# Patient Record
Sex: FEMALE | Race: WHITE
[De-identification: names, ages, dates, MRNs, and addresses within clinical notes are randomized per-mention and may not be internally consistent; named-entity substitution may affect disease eponyms.]

---

## 2018-07-05 ENCOUNTER — HOSPITAL ENCOUNTER (OUTPATIENT)
Dept: HOSPITAL 58 - RHC-LAB | Age: 43
Discharge: HOME | End: 2018-07-05
Attending: INTERNAL MEDICINE
Payer: COMMERCIAL

## 2018-07-05 DIAGNOSIS — Z79.01: ICD-10-CM

## 2018-07-05 DIAGNOSIS — Z51.81: Primary | ICD-10-CM

## 2018-07-05 DIAGNOSIS — Z86.711: ICD-10-CM

## 2018-07-05 PROCEDURE — 85610 PROTHROMBIN TIME: CPT

## 2018-07-05 PROCEDURE — 36415 COLL VENOUS BLD VENIPUNCTURE: CPT

## 2018-07-09 ENCOUNTER — HOSPITAL ENCOUNTER (OUTPATIENT)
Dept: HOSPITAL 58 - RAD | Age: 43
Discharge: HOME | End: 2018-07-09
Attending: INTERNAL MEDICINE
Payer: COMMERCIAL

## 2018-07-09 DIAGNOSIS — Z12.31: Primary | ICD-10-CM

## 2018-07-09 PROCEDURE — 77067 SCR MAMMO BI INCL CAD: CPT

## 2018-07-16 ENCOUNTER — HOSPITAL ENCOUNTER (OUTPATIENT)
Dept: HOSPITAL 58 - RHC-LAB | Age: 43
Discharge: HOME | End: 2018-07-16
Attending: INTERNAL MEDICINE

## 2018-07-16 DIAGNOSIS — Z51.81: Primary | ICD-10-CM

## 2018-07-16 DIAGNOSIS — Z79.01: ICD-10-CM

## 2018-07-16 PROCEDURE — 36415 COLL VENOUS BLD VENIPUNCTURE: CPT

## 2018-07-16 PROCEDURE — 85610 PROTHROMBIN TIME: CPT

## 2019-02-21 ENCOUNTER — HOSPITAL ENCOUNTER (OUTPATIENT)
Dept: HOSPITAL 58 - RHC-LAB | Age: 44
Discharge: HOME | End: 2019-02-21
Attending: NURSE PRACTITIONER
Payer: COMMERCIAL

## 2019-02-21 DIAGNOSIS — R05: ICD-10-CM

## 2019-02-21 DIAGNOSIS — Z86.711: ICD-10-CM

## 2019-02-21 DIAGNOSIS — I10: ICD-10-CM

## 2019-02-21 DIAGNOSIS — J02.9: ICD-10-CM

## 2019-02-21 DIAGNOSIS — R78.5: Primary | ICD-10-CM

## 2019-02-21 PROCEDURE — 36415 COLL VENOUS BLD VENIPUNCTURE: CPT

## 2019-02-21 PROCEDURE — 87502 INFLUENZA DNA AMP PROBE: CPT

## 2019-02-21 PROCEDURE — 80061 LIPID PANEL: CPT

## 2019-02-21 PROCEDURE — 80053 COMPREHEN METABOLIC PANEL: CPT

## 2019-02-21 PROCEDURE — 85025 COMPLETE CBC W/AUTO DIFF WBC: CPT

## 2019-02-21 PROCEDURE — 87651 STREP A DNA AMP PROBE: CPT

## 2019-02-21 PROCEDURE — 85610 PROTHROMBIN TIME: CPT

## 2019-02-25 ENCOUNTER — HOSPITAL ENCOUNTER (OUTPATIENT)
Dept: HOSPITAL 58 - RAD | Age: 44
Discharge: HOME | End: 2019-02-25
Attending: NURSE PRACTITIONER

## 2019-02-25 DIAGNOSIS — F45.8: Primary | ICD-10-CM

## 2019-02-25 DIAGNOSIS — E03.9: ICD-10-CM

## 2019-02-25 DIAGNOSIS — R13.10: ICD-10-CM

## 2019-02-25 NOTE — US
EXAM:  Thyroid ultrasound 

  

History: somatoform disorder. 

  

Technique:  Multiple sonographic images through the thyroid gland were obtained.  Color duplex Dopple
r was used to interrogate vascular flow. 

  

Findings: 

  

The right lobe of the thyroid measures 2.2 cm x 0.9 cm x 1.0 cm and is without discrete nodule identi
fied. 

  

The thyroid isthmus measures 0.3 cm in thickness. 

  

The left lobe of the thyroid measures 2.0 cm x 0.9 cm x 1.3 cm and is without discrete nodule identif
ied. 

  

No extrathyroidal masses are identified.  The thyroid gland is not hypervascular. 

  

Impression:  Small thyroid gland with no nodules identified.

## 2019-04-11 ENCOUNTER — HOSPITAL ENCOUNTER (OUTPATIENT)
Dept: HOSPITAL 58 - RHC-LAB | Age: 44
Discharge: HOME | End: 2019-04-11
Attending: NURSE PRACTITIONER

## 2019-04-11 DIAGNOSIS — Z51.81: Primary | ICD-10-CM

## 2019-04-11 DIAGNOSIS — Z79.01: ICD-10-CM

## 2019-04-11 DIAGNOSIS — I10: ICD-10-CM

## 2019-04-11 PROCEDURE — 36415 COLL VENOUS BLD VENIPUNCTURE: CPT

## 2019-04-11 PROCEDURE — 80061 LIPID PANEL: CPT

## 2019-04-11 PROCEDURE — 85610 PROTHROMBIN TIME: CPT

## 2019-04-11 PROCEDURE — 80053 COMPREHEN METABOLIC PANEL: CPT

## 2019-06-17 ENCOUNTER — HOSPITAL ENCOUNTER (OUTPATIENT)
Dept: HOSPITAL 58 - RHC-LAB | Age: 44
Discharge: HOME | End: 2019-06-17
Attending: NURSE PRACTITIONER

## 2019-06-17 DIAGNOSIS — E78.5: ICD-10-CM

## 2019-06-17 DIAGNOSIS — I10: ICD-10-CM

## 2019-06-17 DIAGNOSIS — D64.9: Primary | ICD-10-CM

## 2019-06-17 DIAGNOSIS — Z86.711: ICD-10-CM

## 2019-06-17 DIAGNOSIS — Z79.01: ICD-10-CM

## 2019-06-17 PROCEDURE — 85610 PROTHROMBIN TIME: CPT

## 2019-06-17 PROCEDURE — 85025 COMPLETE CBC W/AUTO DIFF WBC: CPT

## 2019-06-17 PROCEDURE — 82728 ASSAY OF FERRITIN: CPT

## 2019-06-17 PROCEDURE — 80061 LIPID PANEL: CPT

## 2019-06-17 PROCEDURE — 84466 ASSAY OF TRANSFERRIN: CPT

## 2019-06-17 PROCEDURE — 83540 ASSAY OF IRON: CPT

## 2019-06-17 PROCEDURE — 80053 COMPREHEN METABOLIC PANEL: CPT

## 2019-06-17 PROCEDURE — 83550 IRON BINDING TEST: CPT

## 2019-06-17 PROCEDURE — 82607 VITAMIN B-12: CPT

## 2019-06-17 PROCEDURE — 85045 AUTOMATED RETICULOCYTE COUNT: CPT

## 2019-06-17 PROCEDURE — 36415 COLL VENOUS BLD VENIPUNCTURE: CPT

## 2019-06-17 PROCEDURE — 82746 ASSAY OF FOLIC ACID SERUM: CPT

## 2019-08-20 ENCOUNTER — HOSPITAL ENCOUNTER (OUTPATIENT)
Dept: HOSPITAL 58 - RHC-LAB | Age: 44
Discharge: HOME | End: 2019-08-20
Attending: NURSE PRACTITIONER

## 2019-08-20 DIAGNOSIS — Z51.81: Primary | ICD-10-CM

## 2019-08-20 DIAGNOSIS — Z79.01: ICD-10-CM

## 2019-08-20 PROCEDURE — 36415 COLL VENOUS BLD VENIPUNCTURE: CPT

## 2019-08-20 PROCEDURE — 85610 PROTHROMBIN TIME: CPT
